# Patient Record
Sex: FEMALE | Race: ASIAN | Employment: STUDENT | URBAN - METROPOLITAN AREA
[De-identification: names, ages, dates, MRNs, and addresses within clinical notes are randomized per-mention and may not be internally consistent; named-entity substitution may affect disease eponyms.]

---

## 2019-04-29 ENCOUNTER — THERAPY VISIT (OUTPATIENT)
Dept: PHYSICAL THERAPY | Facility: CLINIC | Age: 24
End: 2019-04-29
Payer: COMMERCIAL

## 2019-04-29 DIAGNOSIS — G89.29 CHRONIC PAIN OF LEFT ANKLE: ICD-10-CM

## 2019-04-29 DIAGNOSIS — M25.572 CHRONIC PAIN OF LEFT ANKLE: ICD-10-CM

## 2019-04-29 PROCEDURE — 97530 THERAPEUTIC ACTIVITIES: CPT | Mod: GP | Performed by: PHYSICAL THERAPIST

## 2019-04-29 PROCEDURE — 97161 PT EVAL LOW COMPLEX 20 MIN: CPT | Mod: GP | Performed by: PHYSICAL THERAPIST

## 2019-04-29 PROCEDURE — 97110 THERAPEUTIC EXERCISES: CPT | Mod: GP | Performed by: PHYSICAL THERAPIST

## 2019-04-29 NOTE — LETTER
[facility]  [address]  [address]  [place] [address]  [phone]    2019    Re: Chad Toledo   :   1995  MRN:  [id number]   REFERRING PHYSICIAN:   Rachel Cast    [facility] [facility]    Date of Initial Evaluation: 19  Visits:  Rxs Used: 1  Reason for Referral:  Chronic pain of left ankle    EVALUATION SUMMARY    [place] [facility] Initial Evaluation  Subjective:  The history is provided by the patient. No  was used.   Chad Bacon is a 23 year old female with a left ankle condition.  Occurance: 3/16/19: tried to do a side flip at a gymnastics center and missed the crash pad.  Suffered an inversion ankle sprain.  Did not seek medical attention until recently.  Condition occurred: in the community.  This is a recurrent condition     Patient reports pain:  Lateral.    Pain is described as sharp and aching and is intermittent and reported as 2/10.  Associated symptoms:  Loss of strength and loss of motion/stiffness. Worse during: unaffected by time of day.  Exacerbated by: plantar flexing foot, jumping, running. and relieved by bracing/immobilizing and rest.  Since onset symptoms are gradually improving.  Special tests:  X-ray (negative for fracture).      General health as reported by patient is excellent.  Pertinent medical history includes:  None.  Medical allergies: no.  Other surgeries include:  None reported.  Current medications:  None as reported by patient.  Current occupation is Student at [employer]; usually participates in [employer] 3x/week but has been unable to do so since injuring herself.        Barriers include:  None as reported by patient.  Red flags:  None as reported by patient.    Patient's Goal:  To be able to jump on her left leg.  Wants to participate in a team [employer] competition on 19             Objective:  Standing Alignment:       Ankle/Foot:  Pes planus R and pes planus L  Gait:    Gait Type:  Normal              [facility]   [facility]   1995              Ankle/Foot Evaluation  ROM:    AROM:    Dorsiflexion:  Left:   WNL  Right:   WNL  Plantarflexion:  Left:  50 (+)    Right:  60  Inversion:  Left:  WNL     Right:  WNL  Eversion:  WNL     Right:  WNL  Great toe flexion:  Left:  WNL     Right:  WNL  Great Toe Extension:  Left:  WNL     Right: WNL    Strength:    Dorsiflexion:  Left: 5/5      Pain:-     Plantarflexion: Left: 4+/5    Pain:-     Inversion:Left: 5/5   Pain:-       Eversion:Left: 4/5   Pain:-      LIGAMENT TESTING:   Anterior Drawer (ATF) Left: trace     Posterior Drawer (PTF) Left: neg     Varus Stress (Calc Fib) Left: neg      Valgus Stress (Deltoid) Left: neg      Rotation (Deltoid) Left: neg      External Rotation (High Ankle) Left: neg         PALPATION:   Left ankle tenderness present at:  posterior talofibular ligament  Left ankle tenderness not present at:   deltoid ligament; navicular; anterior talofibular ligament; calcaneofibular ligament; medial malleolus or lateral malleolus    EDEMA: normal  MOBILITY TESTING:   Talocrural Left: hypermobile      Subtalar Left: normal      Midtarsal Left: normal      First Ray Left: normal      FUNCTIONAL TESTS:     Quad:  Bilateral Leg Squat:  Control is normal control                           [facility]   :   1995                            Toe Raise: functional weakness in peroneals bilat    Proprioception:  Single leg stance (eyes open):  30+ sec bilat  Single leg stance (eyes closed ): 30+ sec bilat  Single leg stance on foam block: 30 sec bilat with increased ankle synergy on left    Hip Strength  Abductors 4/5 bilat  Glute Max 4+/5 left and 5/5 right  General     ROS    Assessment/Plan:    Patient is a 23 year old female with left ankle complaints.    Patient has the following significant findings with corresponding treatment plan.                 Diagnosis 1:  Left Ankle Pain  Pain -  self management, education and home program  Decreased ROM/flexibility - therapeutic exercise  Decreased strength - therapeutic exercise and therapeutic activities  Decreased proprioception - neuro re-education and therapeutic activities  Decreased function - therapeutic activities and home program    Cumulative Therapy Evaluation is: Low complexity.    Previous and current functional limitations:  (See Goal Flow Sheet for this information)    Short term and Long term goals: (See Goal Flow Sheet for this information)     Communication ability:  Patient appears to be able to clearly communicate and understand verbal and written communication and follow directions correctly.  Treatment Explanation - The following has been discussed with the patient:    RX ordered/plan of care  Anticipated outcomes  Possible risks and side effects  This patient would benefit from PT intervention to resume normal activities.   Rehab potential is excellent.    Frequency:  1 X week, once daily  Duration:  for 6 weeks  Discharge Plan:  Achieve all LTG.  Independent in home treatment program.  Reach maximal therapeutic benefit.         Chad S[facility]eyclaywinston   :   1995                      Thank you for your referral.    INQUIRIES  Therapist: Lesly Serrano PT, [facility]  [facility]  [address]  [place] [address]  Phone: [phone]  Fax: [phone]

## 2019-04-29 NOTE — PROGRESS NOTES
[place] [facility] Initial Evaluation  Subjective:  The history is provided by the patient. No  was used.   Chad Bacon is a 23 year old female with a left ankle condition.  Occurance: 3/16/19: tried to do a side flip at a gymnastics center and missed the crash pad.  Suffered an inversion ankle sprain.  Did not seek medical attention until recently.  Condition occurred: in the community.  This is a recurrent condition     Patient reports pain:  Lateral.    Pain is described as sharp and aching and is intermittent and reported as 2/10.  Associated symptoms:  Loss of strength and loss of motion/stiffness. Worse during: unaffected by time of day.  Exacerbated by: plantar flexing foot, jumping, running. and relieved by bracing/immobilizing and rest.  Since onset symptoms are gradually improving.  Special tests:  X-ray (negative for fracture).      General health as reported by patient is excellent.  Pertinent medical history includes:  None.  Medical allergies: no.  Other surgeries include:  None reported.  Current medications:  None as reported by patient.  Current occupation is Student at [employer]; usually participates in [employer] 3x/week but has been unable to do so since injuring herself.        Barriers include:  None as reported by patient.    Red flags:  None as reported by patient.    Patient's Goal:  To be able to jump on her left leg.  Wants to participate in a team [employer] competition on 7/02/19               Objective:  Standing Alignment:                Ankle/Foot:  Pes planus R and pes planus L    Gait:    Gait Type:  Normal               Ankle/Foot Evaluation  ROM:    AROM:    Dorsiflexion:  Left:   WNL  Right:   WNL  Plantarflexion:  Left:  50 (+)    Right:  60  Inversion:  Left:  WNL     Right:  WNL  Eversion:  WNL     Right:  WNL  Great toe flexion:  Left:  WNL     Right:  WNL  Great Toe Extension:  Left:  WNL     Right: WNL    Strength:     Dorsiflexion:  Left: 5/5      Pain:-     Plantarflexion: Left: 4+/5    Pain:-     Inversion:Left: 5/5   Pain:-       Eversion:Left: 4/5   Pain:-                    LIGAMENT TESTING:   Anterior Drawer (ATF) Left: trace     Posterior Drawer (PTF) Left: neg     Varus Stress (Calc Fib) Left: neg      Valgus Stress (Deltoid) Left: neg      Rotation (Deltoid) Left: neg      External Rotation (High Ankle) Left: neg         PALPATION:   Left ankle tenderness present at:  posterior talofibular ligament  Left ankle tenderness not present at:   deltoid ligament; navicular; anterior talofibular ligament; calcaneofibular ligament; medial malleolus or lateral malleolus    EDEMA: normal          MOBILITY TESTING:       Talocrural Left: hypermobile      Subtalar Left: normal      Midtarsal Left: normal      First Ray Left: normal      FUNCTIONAL TESTS:         Quad:      Bilateral Leg Squat:  Control is normal control                                                    Toe Raise: functional weakness in peroneals bilat    Proprioception:  Single leg stance (eyes open):  30+ sec bilat  Single leg stance (eyes closed ): 30+ sec bilat  Single leg stance on foam block: 30 sec bilat with increased ankle synergy on left    Hip Strength  Abductors 4/5 bilat  Glute Max 4+/5 left and 5/5 right  General     ROS    Assessment/Plan:    Patient is a 23 year old female with left ankle complaints.    Patient has the following significant findings with corresponding treatment plan.                Diagnosis 1:  Left Ankle Pain  Pain -  self management, education and home program  Decreased ROM/flexibility - therapeutic exercise  Decreased strength - therapeutic exercise and therapeutic activities  Decreased proprioception - neuro re-education and therapeutic activities  Decreased function - therapeutic activities and home program    Cumulative Therapy Evaluation is: Low complexity.    Previous and current functional limitations:  (See Goal Flow  Sheet for this information)    Short term and Long term goals: (See Goal Flow Sheet for this information)     Communication ability:  Patient appears to be able to clearly communicate and understand verbal and written communication and follow directions correctly.  Treatment Explanation - The following has been discussed with the patient:    RX ordered/plan of care  Anticipated outcomes  Possible risks and side effects  This patient would benefit from PT intervention to resume normal activities.   Rehab potential is excellent.    Frequency:  1 X week, once daily  Duration:  for 6 weeks  Discharge Plan:  Achieve all LTG.  Independent in home treatment program.  Reach maximal therapeutic benefit.    Please refer to the daily flowsheet for treatment today, total treatment time and time spent performing 1:1 timed codes.

## 2019-05-06 ENCOUNTER — THERAPY VISIT (OUTPATIENT)
Dept: PHYSICAL THERAPY | Facility: CLINIC | Age: 24
End: 2019-05-06
Payer: COMMERCIAL

## 2019-05-06 DIAGNOSIS — G89.29 CHRONIC PAIN OF LEFT ANKLE: ICD-10-CM

## 2019-05-06 DIAGNOSIS — M25.572 CHRONIC PAIN OF LEFT ANKLE: ICD-10-CM

## 2019-05-06 PROCEDURE — 97112 NEUROMUSCULAR REEDUCATION: CPT | Mod: GP | Performed by: PHYSICAL THERAPIST

## 2019-05-06 PROCEDURE — 97110 THERAPEUTIC EXERCISES: CPT | Mod: GP | Performed by: PHYSICAL THERAPIST

## 2019-05-15 ENCOUNTER — THERAPY VISIT (OUTPATIENT)
Dept: PHYSICAL THERAPY | Facility: CLINIC | Age: 24
End: 2019-05-15
Payer: COMMERCIAL

## 2019-05-15 DIAGNOSIS — G89.29 CHRONIC PAIN OF LEFT ANKLE: ICD-10-CM

## 2019-05-15 DIAGNOSIS — M25.572 CHRONIC PAIN OF LEFT ANKLE: ICD-10-CM

## 2019-05-15 PROCEDURE — 97112 NEUROMUSCULAR REEDUCATION: CPT | Mod: GP | Performed by: PHYSICAL THERAPY ASSISTANT

## 2019-05-15 PROCEDURE — 97110 THERAPEUTIC EXERCISES: CPT | Mod: GP | Performed by: PHYSICAL THERAPY ASSISTANT

## 2019-05-20 ENCOUNTER — THERAPY VISIT (OUTPATIENT)
Dept: PHYSICAL THERAPY | Facility: CLINIC | Age: 24
End: 2019-05-20
Payer: COMMERCIAL

## 2019-05-20 DIAGNOSIS — M25.572 CHRONIC PAIN OF LEFT ANKLE: ICD-10-CM

## 2019-05-20 DIAGNOSIS — G89.29 CHRONIC PAIN OF LEFT ANKLE: ICD-10-CM

## 2019-05-20 PROCEDURE — 97110 THERAPEUTIC EXERCISES: CPT | Mod: GP | Performed by: PHYSICAL THERAPIST

## 2019-05-20 PROCEDURE — 97112 NEUROMUSCULAR REEDUCATION: CPT | Mod: GP | Performed by: PHYSICAL THERAPIST

## 2019-05-29 ENCOUNTER — THERAPY VISIT (OUTPATIENT)
Dept: PHYSICAL THERAPY | Facility: CLINIC | Age: 24
End: 2019-05-29
Payer: COMMERCIAL

## 2019-05-29 DIAGNOSIS — G89.29 CHRONIC PAIN OF LEFT ANKLE: ICD-10-CM

## 2019-05-29 DIAGNOSIS — M25.572 CHRONIC PAIN OF LEFT ANKLE: ICD-10-CM

## 2019-05-29 PROCEDURE — 97112 NEUROMUSCULAR REEDUCATION: CPT | Mod: GP | Performed by: PHYSICAL THERAPY ASSISTANT

## 2019-05-29 PROCEDURE — 97110 THERAPEUTIC EXERCISES: CPT | Mod: GP | Performed by: PHYSICAL THERAPY ASSISTANT

## 2019-06-03 ENCOUNTER — THERAPY VISIT (OUTPATIENT)
Dept: PHYSICAL THERAPY | Facility: CLINIC | Age: 24
End: 2019-06-03
Payer: COMMERCIAL

## 2019-06-03 DIAGNOSIS — G89.29 CHRONIC PAIN OF LEFT ANKLE: ICD-10-CM

## 2019-06-03 DIAGNOSIS — M25.572 CHRONIC PAIN OF LEFT ANKLE: ICD-10-CM

## 2019-06-03 PROCEDURE — 97112 NEUROMUSCULAR REEDUCATION: CPT | Mod: GP | Performed by: PHYSICAL THERAPIST

## 2019-06-03 PROCEDURE — 97110 THERAPEUTIC EXERCISES: CPT | Mod: GP | Performed by: PHYSICAL THERAPIST

## 2019-06-03 PROCEDURE — 97530 THERAPEUTIC ACTIVITIES: CPT | Mod: GP | Performed by: PHYSICAL THERAPIST

## 2019-06-03 NOTE — LETTER
[facility]  [address]  [address]  [place] [address]  [phone]    Mary 3, 2019    Re: Chad Toledo   :   1995  MRN:  [id number]   REFERRING PHYSICIAN:   Rachel Cast    [place] [facility]    Date of Initial Evaluation: 19  Visits:  Rxs Used: 6  Reason for Referral:  Chronic pain of left ankle    PROGRESS  REPORT    Progress reporting period is from 19 to 19.       SUBJECTIVE  Patient reports approximately 85% improvement in her left ankle since the start of therapy.  She has been able to tolerate longer periods of walking four days/week up to 45 minutes (2-2.5 miles). Recently tried doing some light [employer] drills and tolerated these without incident.  Ankle still feels a little weak with jogging.     Current pain level is 0/10  .     Previous pain level was  2/10  .   Changes in function:  As noted above  Adverse reaction to treatment or activity: None    OBJECTIVE  Left Ankle AROM: WNL and painfree  Left LE strength: Hip Abductors 4+/5 bilat, Glute Max 5-/5, ankle DF 5/5, PF 5/5, INV 5/5, EV 5/5.  Demonstrates mild loss of pelvifemoral control with single leg squats on left.   Proprioception:  Very good with single leg stance eyes closed on floor; fair/poor when exposed to mild perturbations (10 sec)      ASSESSMENT/PLAN  Updated problem list and treatment plan: Diagnosis 1:  Left Ankle Pain   Decreased strength - therapeutic exercise and therapeutic activities  Decreased proprioception - neuro re-education and therapeutic activities  Decreased function - therapeutic activities  STG/LTGs have been met or progress has been made towards goals:  Yes (See Goal flow sheet completed today.)    Assessment of Progress: The patient's condition is improving.  Patient is meeting short term goals and is progressing towards long term goals.  Self Management Plans:  Patient has been instructed in a home treatment  program.  Chad Toledo   :   1995        I have re-evaluated this patient and find that the nature, scope, duration and intensity of the therapy is appropriate for the medical condition of the patient.  Chad continues to require the following intervention to meet STG and LTG's:  PT    Recommendations:  This patient would benefit from continued therapy to further progress functional drills in preparation for [employer] competition 19..     Frequency:  1 X week, once daily  Duration:  for 3 weeks                  Thank you for your referral.    INQUIRIES  Therapist: Lesly Serrano, PT, [facility]  [facility]  [address]  [place] [address]  Phone: [phone]  Fax: [phone]

## 2019-06-03 NOTE — PROGRESS NOTES
Subjective:  HPI                    Objective:  System    Physical Exam    General     ROS    Assessment/Plan:    PROGRESS  REPORT    Progress reporting period is from 4/29/19 to 6/03/19.       SUBJECTIVE  Patient reports approximately 85% improvement in her left ankle since the start of therapy.  She has been able to tolerate longer periods of walking four days/week up to 45 minutes (2-2.5 miles).  Recently tried doing some light [employer] drills and tolerated these without incident.  Ankle still feels a little weak with jogging.     Current pain level is 0/10  .     Previous pain level was  2/10  .   Changes in function:  As noted above  Adverse reaction to treatment or activity: None    OBJECTIVE  Left Ankle AROM: WNL and painfree  Left LE strength: Hip Abductors 4+/5 bilat, Glute Max 5-/5, ankle DF 5/5, PF 5/5, INV 5/5, EV 5/5.  Demonstrates mild loss of pelvifemoral control with single leg squats on left.   Proprioception:  Very good with single leg stance eyes closed on floor; fair/poor when exposed to mild perturbations (10 sec)      ASSESSMENT/PLAN  Updated problem list and treatment plan: Diagnosis 1:  Left Ankle Pain   Decreased strength - therapeutic exercise and therapeutic activities  Decreased proprioception - neuro re-education and therapeutic activities  Decreased function - therapeutic activities  STG/LTGs have been met or progress has been made towards goals:  Yes (See Goal flow sheet completed today.)    Assessment of Progress: The patient's condition is improving.  Patient is meeting short term goals and is progressing towards long term goals.  Self Management Plans:  Patient has been instructed in a home treatment program.  I have re-evaluated this patient and find that the nature, scope, duration and intensity of the therapy is appropriate for the medical condition of the patient.  Chad continues to require the following intervention to meet STG and LTG's:  PT    Recommendations:  This  patient would benefit from continued therapy to further progress functional drills in preparation for [employer] competition 7/02/19..     Frequency:  1 X week, once daily  Duration:  for 3 weeks        Please refer to the daily flowsheet for treatment today, total treatment time and time spent performing 1:1 timed codes.

## 2019-06-10 ENCOUNTER — THERAPY VISIT (OUTPATIENT)
Dept: PHYSICAL THERAPY | Facility: CLINIC | Age: 24
End: 2019-06-10
Payer: COMMERCIAL

## 2019-06-10 DIAGNOSIS — G89.29 CHRONIC PAIN OF LEFT ANKLE: ICD-10-CM

## 2019-06-10 DIAGNOSIS — M25.572 CHRONIC PAIN OF LEFT ANKLE: ICD-10-CM

## 2019-06-10 PROCEDURE — 97530 THERAPEUTIC ACTIVITIES: CPT | Mod: GP | Performed by: PHYSICAL THERAPY ASSISTANT

## 2019-06-10 PROCEDURE — 97112 NEUROMUSCULAR REEDUCATION: CPT | Mod: GP | Performed by: PHYSICAL THERAPY ASSISTANT

## 2019-06-20 ENCOUNTER — THERAPY VISIT (OUTPATIENT)
Dept: PHYSICAL THERAPY | Facility: CLINIC | Age: 24
End: 2019-06-20
Payer: COMMERCIAL

## 2019-06-20 DIAGNOSIS — G89.29 CHRONIC PAIN OF LEFT ANKLE: ICD-10-CM

## 2019-06-20 DIAGNOSIS — M25.572 CHRONIC PAIN OF LEFT ANKLE: ICD-10-CM

## 2019-06-20 PROCEDURE — 97112 NEUROMUSCULAR REEDUCATION: CPT | Mod: GP | Performed by: PHYSICAL THERAPIST

## 2019-06-20 PROCEDURE — 97530 THERAPEUTIC ACTIVITIES: CPT | Mod: GP | Performed by: PHYSICAL THERAPIST

## 2019-12-06 PROBLEM — M25.572 CHRONIC PAIN OF LEFT ANKLE: Status: RESOLVED | Noted: 2019-04-29 | Resolved: 2019-12-06

## 2019-12-06 PROBLEM — G89.29 CHRONIC PAIN OF LEFT ANKLE: Status: RESOLVED | Noted: 2019-04-29 | Resolved: 2019-12-06

## 2019-12-06 NOTE — PROGRESS NOTES
Discharge Note    Progress reporting period is from initial evaluation date Apr 29, 2019 to Jun 20, 2019.      Please see information below for last relevant information on current status.  Patient was seen for 8 visits.    SUBJECTIVE  At last scheduled visit, patient was reporting significant improvement in her left ankle. She had resumed [employer] practices again without any issues. Also had returned to doing 1 mile walk/jog without any pain.    Current pain level is 0/10.     Previous pain level was  2/10.   Changes in function:  Yes (See Goal flowsheet attached for changes in current functional level)  Adverse reaction to treatment or activity: None    OBJECTIVE  During dynamic testing L ankle was comparable to the R for speed during square hopping test and side to side hopping test.  Patient exhibited slight deficits in balance on the L ankle compared to the R with Y balance test.    ASSESSMENT/PLAN  Diagnosis: Left Ankle Pain   STG/LTGs have been met or progress has been made towards goals:  Yes, please see goal flowsheet for most current information  Assessment of Progress: patient was meeting STG and progressing toward LTG    Self Management Plans:  HEP  Tanyajose juanreyes continues to require the following intervention to meet STG and LTG's:  HEP.    Recommendations:  Following last visit on 6/20/19, patient failed to schedule any additional appointments.  No further information on patient's status is known. Will consequently discharge from physical therapy.      Please refer to the daily flowsheet for treatment today, total treatment time and time spent performing 1:1 timed codes.